# Patient Record
Sex: FEMALE | Race: WHITE | NOT HISPANIC OR LATINO | URBAN - METROPOLITAN AREA
[De-identification: names, ages, dates, MRNs, and addresses within clinical notes are randomized per-mention and may not be internally consistent; named-entity substitution may affect disease eponyms.]

---

## 2019-01-02 ENCOUNTER — EMERGENCY (EMERGENCY)
Facility: HOSPITAL | Age: 54
LOS: 1 days | Discharge: HOME | End: 2019-01-02
Attending: EMERGENCY MEDICINE
Payer: COMMERCIAL

## 2019-01-02 VITALS
DIASTOLIC BLOOD PRESSURE: 74 MMHG | TEMPERATURE: 98 F | HEART RATE: 67 BPM | OXYGEN SATURATION: 100 % | RESPIRATION RATE: 16 BRPM | SYSTOLIC BLOOD PRESSURE: 129 MMHG

## 2019-01-02 PROCEDURE — 73140 X-RAY EXAM OF FINGER(S): CPT | Mod: 26,RT

## 2019-01-02 PROCEDURE — 73140 X-RAY EXAM OF FINGER(S): CPT

## 2019-01-02 PROCEDURE — 99283 EMERGENCY DEPT VISIT LOW MDM: CPT | Mod: 25

## 2019-01-02 PROCEDURE — 99283 EMERGENCY DEPT VISIT LOW MDM: CPT

## 2019-01-02 RX ORDER — IBUPROFEN 200 MG
600 TABLET ORAL ONCE
Qty: 0 | Refills: 0 | Status: COMPLETED | OUTPATIENT
Start: 2019-01-02 | End: 2019-01-02

## 2019-01-02 NOTE — ED PROVIDER NOTE - OBJECTIVE STATEMENT
52 y/o F pt with PMHx of HLD presents to ED c/o R 4th digit pain and swelling x 10 days. Pt describes pain as "nerve pain." Patient states she went to urgent care and was started on abx. Pt reports some improvement in swelling. Pt has been taking Advil 500mg to some relief. Patient denies fever, chills, drainage, discharge, or any other complaints.

## 2019-01-02 NOTE — ED ADULT NURSE NOTE - NS ED NURSE ELOPE COMMENTS
Patient left without notifying ED staff , patient called via phone#[phone],States she will come back to ED ,  notified .

## 2019-01-02 NOTE — ED PROVIDER NOTE - MEDICAL DECISION MAKING DETAILS
52 y/o F pt presents to ED c/o finger pain and swelling, likely nerve irritation and possible mild infection; however, pt is already on abx for the past two days. Very low suspicion for ed injury, but will obtain X-ray per pt's request. Will encourage pt to continue abx, take Motrin, and apply ice. Advised on hand/general surgery follow up PRN.

## 2019-01-02 NOTE — ED PROVIDER NOTE - SKIN WOUND DESCRIPTION
R 4th digit with skin tag overgrowth tissue on the lateral radial side of nail, mild inflammation, minimal swelling, and tenderness to palpation at site. No signs of paronychia or felon. Pt is able to move extremity freely.

## 2019-01-02 NOTE — ED ADULT NURSE NOTE - OBJECTIVE STATEMENT
c/o right 4th finger pain and swelling  x10 days.Was seen at an [facility] and was started on antibiotics,swelling is getting better.

## 2022-03-31 NOTE — ED PROVIDER NOTE - CARDIAC, MLM
X Size Of Lesion In Cm (Optional): 0 Normal rate, regular rhythm.  Heart sounds S1, S2.  No murmurs, rubs or gallops.
